# Patient Record
Sex: FEMALE | Race: WHITE | NOT HISPANIC OR LATINO | URBAN - METROPOLITAN AREA
[De-identification: names, ages, dates, MRNs, and addresses within clinical notes are randomized per-mention and may not be internally consistent; named-entity substitution may affect disease eponyms.]

---

## 2023-09-11 DIAGNOSIS — Z12.31 BREAST CANCER SCREENING BY MAMMOGRAM: ICD-10-CM

## 2023-10-17 NOTE — PROGRESS NOTES
Eli Oneil female   1970 53 y.o.   [id number]      Chief Complaint  Annual mammogram and exam, history of right DCIS    History Of Present Illness  Eli Oneil is a 53 y.o. 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 8/22/2016 Kandy Ramon performed right breast needle localized partial mastectomy. Final pathology revealed no residual disease, core measured 0.5cm. She did not follow up with Radiation Oncology or Medical Oncology. She had a right breast core biopsy in January 2021 demonstrating benign pathology. She presents today for annual mammogram and exam. She denies any new masses or lumps.     REPRODUCTIVE HISTORY: menarche age 15, GP, first birth age 20, , OCP's, premenopausal, LMP, scattered fibroglandular tissue    FAMILY CANCER HISTORY:   None    Surgical History  She has a past surgical history that includes Other surgical history (11/12/2013) and Breast lumpectomy (01/15/2018).     Social History  She has no history on file for tobacco use, alcohol use, and drug use.    Family History  No family history on file.     Allergies  Patient has no allergy information on record.    Medications  No current outpatient medications      REVIEW OF SYSTEMS    Constitutional:  Negative for appetite change, fatigue, fever and unexpected weight change.   HENT:  Negative for ear pain, hearing loss, nosebleeds, sore throat and trouble swallowing.    Eyes:  Negative for discharge, itching and visual disturbance.   Respiratory:  Negative for cough, chest tightness and shortness of breath.    Cardiovascular:  Negative for chest pain, palpitations and leg swelling.   Breast: as indicated in HPI  Gastrointestinal:  Negative for abdominal pain, constipation, diarrhea and nausea.   Endocrine: Negative for cold intolerance and heat intolerance.   Genitourinary:  Negative for dysuria, frequency, hematuria, pelvic pain and vaginal bleeding.    Musculoskeletal:  Negative for arthralgias, back pain, gait problem, joint swelling and myalgias.   Skin:  Negative for color change and rash.   Allergic/Immunologic: Negative for environmental allergies and food allergies.   Neurological:  Negative for dizziness, tremors, speech difficulty, weakness, numbness and headaches.   Hematological:  Does not bruise/bleed easily.   Psychiatric/Behavioral:  Negative for agitation, dysphoric mood and sleep disturbance. The patient is not nervous/anxious.         Past Medical History  She has a past medical history of Other conditions influencing health status, Personal history of other diseases of the musculoskeletal system and connective tissue (06/06/2014), Personal history of other diseases of the musculoskeletal system and connective tissue, and Systemic lupus erythematosus, unspecified (CMS/HCC).     Physical Exam  Patient is alert and oriented x3 and in a relaxed and appropriate mood. Her gait is steady and hand grasps are equal. Sclera is clear. The breasts are nearly symmetrical. The tissue is soft without palpable abnormalities, discrete nodules or masses. The skin and nipples appear normal. There is no cervical, supraclavicular or axillary lymphadenopathy. Heart rate and rhythm normal, S1 and S2 appreciated. The lungs are clear to auscultation bilaterally. Abdomen is soft and non-tender.       Physical Exam     Last Recorded Vitals  There were no vitals filed for this visit.    Relevant Results   Time was spent viewing digital images of the radiology testing with the patient. I explained the results in depth, along with suggested explanation for follow up recommendations based on the testing results. BI-RADS Category ***    Imaging  No results found for this or any previous visit from the past 365 days.       Assessment/Plan   Normal clinical exam and imaging, history of right DCIS, history of right core biopsy, benign, no family history of breast cancer,  scattered fibroglandular tissue    Plan: Return to PCP for annual mammograms.     Patient Discussion/Summary  Your clinical examination and imaging are normal. You no longer need to be seen by a breast specialist for an annual physical breast examination. It is important to continue annual screening mammograms and breast exams through your primary care provider. Please return to see me if you have a new breast problem or abnormal mammogram. It has been a pleasure having you as a patient.     You can see your health information, review clinical summaries from office visits & test results online when you follow your health with MY  Chart, a personal health record. To sign up go to [email or web address]. If you need assistance with signing up or trouble getting into your account call [employer] Patient Line 24/7 at [phone].    My office phone number is [phone] if you need to get in touch with me or have additional questions or concerns. Thank you for choosing [facility] and trusting me as your healthcare provider. I am honored to be a provider on your health care team and I remain dedicated to helping you achieve your health goals.       Emely Whittington, APRN-CNP

## 2023-10-23 PROBLEM — L04.0 ACUTE LYMPHADENITIS OF FACE, HEAD AND NECK: Status: ACTIVE | Noted: 2023-10-23

## 2023-10-23 PROBLEM — M79.7 FIBROMYALGIA: Status: ACTIVE | Noted: 2023-10-23

## 2023-10-23 PROBLEM — R10.31 BILATERAL LOWER ABDOMINAL DISCOMFORT: Status: ACTIVE | Noted: 2023-10-23

## 2023-10-23 PROBLEM — R14.0 ABDOMINAL BLOATING: Status: ACTIVE | Noted: 2023-10-23

## 2023-10-23 PROBLEM — K59.09 CHRONIC CONSTIPATION: Status: ACTIVE | Noted: 2023-10-23

## 2023-10-23 PROBLEM — K92.1 HEMATOCHEZIA: Status: ACTIVE | Noted: 2023-10-23

## 2023-10-23 PROBLEM — R11.2 NAUSEA AND VOMITING: Status: ACTIVE | Noted: 2023-10-23

## 2023-10-23 PROBLEM — R19.4 CHANGE IN BOWEL HABITS: Status: ACTIVE | Noted: 2023-10-23

## 2023-10-23 PROBLEM — E78.5 DYSLIPIDEMIA: Status: ACTIVE | Noted: 2023-10-23

## 2023-10-23 PROBLEM — R92.1 CALCIFICATION OF RIGHT BREAST: Status: ACTIVE | Noted: 2023-10-23

## 2023-10-23 PROBLEM — J02.9 ACUTE PHARYNGITIS: Status: ACTIVE | Noted: 2023-10-23

## 2023-10-23 PROBLEM — D05.11 DUCTAL CARCINOMA IN SITU OF RIGHT BREAST: Status: ACTIVE | Noted: 2023-10-23

## 2023-10-23 PROBLEM — R10.9 ABDOMINAL PAIN: Status: ACTIVE | Noted: 2023-10-23

## 2023-10-23 PROBLEM — M79.18 MYOFASCIAL PAIN SYNDROME: Status: ACTIVE | Noted: 2023-10-23

## 2023-10-23 PROBLEM — R53.83 FATIGUE: Status: ACTIVE | Noted: 2023-10-23

## 2023-10-23 PROBLEM — L93.0 LUPUS ERYTHEMATOSUS: Status: ACTIVE | Noted: 2023-10-23

## 2023-10-23 PROBLEM — N63.10 LUMP OF BREAST, RIGHT: Status: ACTIVE | Noted: 2023-10-23

## 2023-10-23 PROBLEM — F17.200 NICOTINE DEPENDENCE: Status: ACTIVE | Noted: 2023-10-23

## 2023-10-23 PROBLEM — R20.0 NUMBNESS: Status: ACTIVE | Noted: 2023-10-23

## 2023-10-23 PROBLEM — R13.19 ESOPHAGEAL DYSPHAGIA: Status: ACTIVE | Noted: 2023-10-23

## 2023-10-23 PROBLEM — G89.4 CHRONIC PAIN SYNDROME: Status: ACTIVE | Noted: 2023-10-23

## 2023-10-23 PROBLEM — R92.0 ABNORMAL FINDING ON MAMMOGRAPHY, MICROCALCIFICATION: Status: ACTIVE | Noted: 2023-10-23

## 2023-10-23 PROBLEM — Z85.3 PERSONAL HISTORY OF MALIGNANT NEOPLASM OF BREAST: Status: ACTIVE | Noted: 2023-10-23

## 2023-10-23 PROBLEM — N63.20 LUMP OF LEFT BREAST: Status: ACTIVE | Noted: 2023-10-23

## 2023-10-23 PROBLEM — R10.32 BILATERAL LOWER ABDOMINAL DISCOMFORT: Status: ACTIVE | Noted: 2023-10-23

## 2023-10-23 RX ORDER — L.ACIDOPH/B.ANIMALIS/B.LONGUM 15B CELL
1 CAPSULE ORAL DAILY
COMMUNITY
Start: 2023-05-23

## 2023-10-23 RX ORDER — IBUPROFEN 800 MG/1
1 TABLET ORAL DAILY
COMMUNITY

## 2023-10-23 RX ORDER — OMEPRAZOLE 40 MG/1
CAPSULE, DELAYED RELEASE ORAL
COMMUNITY
Start: 2018-07-16

## 2023-10-23 RX ORDER — PREGABALIN 50 MG/1
1 CAPSULE ORAL DAILY
COMMUNITY

## 2023-10-23 RX ORDER — GABAPENTIN 600 MG/1
600 TABLET ORAL 2 TIMES DAILY
COMMUNITY
Start: 2023-08-19

## 2023-10-23 RX ORDER — AZITHROMYCIN 250 MG/1
TABLET, FILM COATED ORAL
COMMUNITY
Start: 2023-07-25

## 2023-10-23 RX ORDER — BUPROPION HYDROCHLORIDE 150 MG/1
1 TABLET ORAL DAILY
COMMUNITY
Start: 2016-06-21

## 2023-10-23 RX ORDER — BUPRENORPHINE AND NALOXONE 8; 2 MG/1; MG/1
FILM, SOLUBLE BUCCAL; SUBLINGUAL
COMMUNITY
Start: 2023-08-15

## 2023-10-23 RX ORDER — POLYETHYLENE GLYCOL 3350 17 G/17G
POWDER, FOR SOLUTION ORAL
COMMUNITY
Start: 2018-01-15

## 2023-10-25 ENCOUNTER — APPOINTMENT (OUTPATIENT)
Dept: RADIOLOGY | Facility: HOSPITAL | Age: 53
End: 2023-10-25
Payer: COMMERCIAL

## 2023-10-25 ENCOUNTER — APPOINTMENT (OUTPATIENT)
Dept: SURGICAL ONCOLOGY | Facility: HOSPITAL | Age: 53
End: 2023-10-25
Payer: COMMERCIAL

## 2023-11-15 ENCOUNTER — APPOINTMENT (OUTPATIENT)
Dept: RADIOLOGY | Facility: HOSPITAL | Age: 53
End: 2023-11-15
Payer: COMMERCIAL

## 2023-12-08 ENCOUNTER — APPOINTMENT (OUTPATIENT)
Dept: RADIOLOGY | Facility: HOSPITAL | Age: 53
End: 2023-12-08
Payer: COMMERCIAL

## 2023-12-11 ENCOUNTER — HOSPITAL ENCOUNTER (OUTPATIENT)
Dept: RADIOLOGY | Facility: HOSPITAL | Age: 53
Discharge: HOME | End: 2023-12-11
Payer: COMMERCIAL

## 2023-12-11 VITALS — BODY MASS INDEX: 24.11 KG/M2 | HEIGHT: 66 IN | WEIGHT: 150 LBS

## 2023-12-11 DIAGNOSIS — Z12.31 BREAST CANCER SCREENING BY MAMMOGRAM: ICD-10-CM

## 2023-12-11 PROCEDURE — 77067 SCR MAMMO BI INCL CAD: CPT | Mod: BILATERAL PROCEDURE | Performed by: RADIOLOGY

## 2023-12-11 PROCEDURE — 77063 BREAST TOMOSYNTHESIS BI: CPT

## 2023-12-11 PROCEDURE — 77063 BREAST TOMOSYNTHESIS BI: CPT | Mod: BILATERAL PROCEDURE | Performed by: RADIOLOGY

## 2023-12-18 ENCOUNTER — HOSPITAL ENCOUNTER (OUTPATIENT)
Dept: RADIOLOGY | Facility: EXTERNAL LOCATION | Age: 53
Discharge: HOME | End: 2023-12-18

## 2025-03-20 ENCOUNTER — OFFICE VISIT (OUTPATIENT)
Dept: SURGICAL ONCOLOGY | Facility: HOSPITAL | Age: 55
End: 2025-03-20
Payer: COMMERCIAL

## 2025-03-20 ENCOUNTER — HOSPITAL ENCOUNTER (OUTPATIENT)
Dept: RADIOLOGY | Facility: HOSPITAL | Age: 55
Discharge: HOME | End: 2025-03-20
Payer: COMMERCIAL

## 2025-03-20 VITALS
WEIGHT: 150 LBS | RESPIRATION RATE: 16 BRPM | HEIGHT: 66 IN | SYSTOLIC BLOOD PRESSURE: 118 MMHG | DIASTOLIC BLOOD PRESSURE: 60 MMHG | HEART RATE: 78 BPM | BODY MASS INDEX: 24.11 KG/M2

## 2025-03-20 DIAGNOSIS — Z85.3 PERSONAL HISTORY OF MALIGNANT NEOPLASM OF BREAST: Primary | ICD-10-CM

## 2025-03-20 DIAGNOSIS — Z12.31 OTHER SCREENING MAMMOGRAM: ICD-10-CM

## 2025-03-20 PROCEDURE — 3008F BODY MASS INDEX DOCD: CPT | Performed by: NURSE PRACTITIONER

## 2025-03-20 PROCEDURE — 77063 BREAST TOMOSYNTHESIS BI: CPT

## 2025-03-20 PROCEDURE — 99213 OFFICE O/P EST LOW 20 MIN: CPT | Performed by: NURSE PRACTITIONER

## 2025-03-20 PROCEDURE — 99203 OFFICE O/P NEW LOW 30 MIN: CPT | Performed by: NURSE PRACTITIONER

## 2025-03-20 NOTE — PROGRESS NOTES
[facility] [place]  Eli Oneil female   1970 54 y.o.   [id number]      Chief Complaint  New patient, annual mammogram and exam, history right breast cancer.    History Of Present Illness  Eli Oneil is a pleasant 54 y.o.  female s/p right needle localized partial mastectomy on 2016 for ductal carcinoma in situ (DCIS), grade 1, ER+95%, [place]+85%, 0.5 cm and negative margins. She declined radiation therapy and endocrine therapy. She has a history of a benign right core biopsy 2020. She has no family history of breast cancer. She has a personal history of RA and lupus. She presents today for annual mammogram and exam.     BREAST IMAGIN2023 Bilateral screening mammogram, indicates BI-RADS Category 2.    REPRODUCTIVE HISTORY: menarche age 13, , first birth age 20, did not breastfeed, no OCP's, natural menopause age 49, no HRT, scattered fibroglandular tissue    FAMILY CANCER HISTORY:    None    Review of Systems  Constitutional:  Negative for appetite change, fatigue, fever and unexpected weight change.   HENT:  Negative for ear pain, hearing loss, nosebleeds, and trouble swallowing. Positive sore throat.  Eyes:  Negative for discharge, itching and visual disturbance. Positive eye pain.  Breast: As stated in HPI.  Respiratory:  Negative for chest tightness and shortness of breath.  Positive cough and wheezing.  Cardiovascular:  Negative for chest pain, palpitations and leg swelling.   Gastrointestinal:  Negative for abdominal pain, diarrhea and nausea. Positive constipation.  Endocrine: Negative for cold intolerance and heat intolerance.   Genitourinary:  Negative for dysuria, frequency, hematuria, pelvic pain and vaginal bleeding.   Musculoskeletal: Positive joint pain, muscle pain, muscle weakness, joint stiffness/swelling, muscle cramps, hip/groin pain, back pain, and neck pain.   Skin:  Negative for color change and rash.   Allergic/Immunologic: Negative for  environmental allergies and food allergies.   Neurological:  Negative for dizziness, tremors, speech difficulty, weakness. Positive headache, numbness/tingling.  Hematological:  Does not bruise/bleed easily.   Psychiatric/Behavioral:  Negative for agitation, dysphoric mood and sleep disturbance. Positive anxiety.    Past Medical History  She has a past medical history of Other conditions influencing health status, Personal history of other diseases of the musculoskeletal system and connective tissue (06/06/2014), Personal history of other diseases of the musculoskeletal system and connective tissue, and Systemic lupus erythematosus, unspecified.    Surgical History  She has a past surgical history that includes Other surgical history (11/12/2013) and Breast biopsy.    Family History  Cancer-related family history is not on file.     Social History  She reports that she has been smoking cigarettes. She has never used smokeless tobacco. No history on file for alcohol use and drug use.    Allergies  Patient has no known allergies.    Medications  Current Outpatient Medications   Medication Instructions    buprenorphine-naloxone (Suboxone) 8-2 mg SL film dissolve 1 1/2 films under the tongue three times a day    gabapentin (NEURONTIN) 600 mg, oral, 2 times daily    ibuprofen 800 mg tablet 1 tablet, oral, Daily       Last Recorded Vitals  Vitals:    03/20/25 1435   BP: 118/60   Pulse: 78   Resp: 16        Physical Exam  Patient is alert and oriented x3 and in a relaxed and appropriate mood. Her gait is steady and hand grasps are equal. Sclera is clear. The breasts are nearly symmetrical. Right breast has a well healed partial mastectomy incision. The tissue is soft without palpable abnormalities, discrete nodules or masses. The skin and nipples appear normal. There is no cervical, supraclavicular or axillary lymphadenopathy.       Relevant Results and Imaging  pending      Visit Diagnosis  1. Personal history of  malignant neoplasm of breast            Assessment  Normal clinical exam, history right breast cancer, lumpectomy, scattered fibroglandular tissue    Plan  I will notify you if your screening mammogram is abnormal. You completed 5 years of surveillance previously with breast surgical oncology. Return to PCP for annual mammograms and exams.     Patient Discussion/Summary  Your clinical examination is normal. You had a screening mammogram today and the results are pending. I will inform you if the screening mammogram is abnormal.     You no longer need to be seen by a breast specialist for an annual physical breast examination. It is important to continue annual screening mammograms and breast exams through your primary care provider. Please return to see me if you have a new breast problem or abnormal mammogram. It has been a pleasure having you as a patient.     You can see your health information, review clinical summaries from office visits & test results online when you follow your health with MY  Chart, a personal health record. To sign up go to [email or web address]. If you need assistance with signing up or trouble getting into your account call [employer] Patient Line 24/7 at [phone].    My office phone number is [phone] if you need to get in touch with me or have additional questions or concerns. Thank you for choosing [facility] and trusting me as your healthcare provider. I look forward to seeing you again at your next office visit. I am honored to be a provider on your health care team and I remain dedicated to helping you achieve your health goals.    Christina Hernández, ANGELA-CNP

## 2025-03-26 ENCOUNTER — TELEPHONE (OUTPATIENT)
Dept: SURGICAL ONCOLOGY | Facility: HOSPITAL | Age: 55
End: 2025-03-26
Payer: COMMERCIAL

## 2025-03-26 DIAGNOSIS — R92.8 ABNORMAL FINDING ON BREAST IMAGING: Primary | ICD-10-CM

## 2025-03-26 NOTE — TELEPHONE ENCOUNTER
Result Communication    Office to call and schedule additional imaging.    Resulted Orders   BI mammo bilateral screening tomosynthesis    Narrative    Interpreted By:  Vanessa Starkey,   STUDY:  BI MAMMO BILATERAL SCREENING TOMOSYNTHESIS;  3/20/2025 3:20 pm      ACCESSION NUMBER(S):  [id number]      ORDERING CLINICIAN:  SAMANTHA RIVERA      INDICATION:  Screening. History of an excisional right breast biopsy in 2017.      ,Z12.31 Encounter for screening mammogram for malignant neoplasm of  breast      COMPARISON:  12/11/2023, 11/17/2021, 03/28/2019, 06/24/2016      FINDINGS:  2D and tomosynthesis images were reviewed at 1 mm slice thickness.      Density:  There are scattered areas of fibroglandular density.      There is a focal asymmetry in the central superior left breast at  posterior depth. In the inferior medial right breast at middle depth  there are surgical clips and scarring at the excision site. The  excision site is stable. No suspicious masses or calcifications are  identified in the right breast.        Impression    1. Focal asymmetry in the left breast. Additional diagnostic  mammographic views are recommended. An ultrasound may be indicated.  2. No evidence of malignancy in the right breast.      BI-RADS CATEGORY:  BI-RADS Category:  0 Incomplete; Need Additional Imaging Evaluation  Recommendation:  Additional Imaging.  Recommended Date:  Immediate.  Laterality:  Left.              For any future breast imaging appointments, please call [phone] ([phone]).          MACRO:  None      Signed by: Vanessa Starkey 3/24/2025 11:21 AM  Dictation workstation:   [id number]       3:21 PM

## 2025-03-27 ENCOUNTER — APPOINTMENT (OUTPATIENT)
Dept: RADIOLOGY | Facility: CLINIC | Age: 55
End: 2025-03-27
Payer: COMMERCIAL

## 2025-05-13 ENCOUNTER — APPOINTMENT (OUTPATIENT)
Dept: RADIOLOGY | Facility: HOSPITAL | Age: 55
End: 2025-05-13
Payer: COMMERCIAL

## 2025-06-04 ENCOUNTER — HOSPITAL ENCOUNTER (OUTPATIENT)
Dept: RADIOLOGY | Facility: HOSPITAL | Age: 55
Discharge: HOME | End: 2025-06-04
Payer: COMMERCIAL

## 2025-06-04 VITALS — WEIGHT: 145 LBS | BODY MASS INDEX: 23.3 KG/M2 | HEIGHT: 66 IN

## 2025-06-04 DIAGNOSIS — R92.8 ABNORMAL FINDING ON BREAST IMAGING: ICD-10-CM

## 2025-06-04 PROCEDURE — 77065 DX MAMMO INCL CAD UNI: CPT | Mod: LT

## 2025-06-04 PROCEDURE — 76642 ULTRASOUND BREAST LIMITED: CPT | Mod: LT
